# Patient Record
Sex: FEMALE | Race: OTHER | HISPANIC OR LATINO | URBAN - METROPOLITAN AREA
[De-identification: names, ages, dates, MRNs, and addresses within clinical notes are randomized per-mention and may not be internally consistent; named-entity substitution may affect disease eponyms.]

---

## 2017-08-21 ENCOUNTER — EMERGENCY (EMERGENCY)
Facility: HOSPITAL | Age: 67
LOS: 1 days | Discharge: HOME | End: 2017-08-21
Admitting: EMERGENCY MEDICINE
Payer: MEDICARE

## 2017-08-21 VITALS
OXYGEN SATURATION: 97 % | HEART RATE: 97 BPM | RESPIRATION RATE: 18 BRPM | TEMPERATURE: 99 F | DIASTOLIC BLOOD PRESSURE: 89 MMHG | WEIGHT: 190.04 LBS | SYSTOLIC BLOOD PRESSURE: 147 MMHG

## 2017-08-21 DIAGNOSIS — M25.512 PAIN IN LEFT SHOULDER: ICD-10-CM

## 2017-08-21 DIAGNOSIS — G89.29 OTHER CHRONIC PAIN: ICD-10-CM

## 2017-08-21 PROCEDURE — 99283 EMERGENCY DEPT VISIT LOW MDM: CPT

## 2017-08-21 RX ORDER — TRAMADOL HYDROCHLORIDE 50 MG/1
1 TABLET ORAL
Qty: 16 | Refills: 0 | OUTPATIENT
Start: 2017-08-21 | End: 2017-08-25

## 2017-08-21 RX ORDER — IBUPROFEN 200 MG
1 TABLET ORAL
Qty: 16 | Refills: 0 | OUTPATIENT
Start: 2017-08-21 | End: 2017-08-25

## 2017-08-21 RX ORDER — TRAMADOL HYDROCHLORIDE 50 MG/1
50 TABLET ORAL ONCE
Qty: 0 | Refills: 0 | Status: DISCONTINUED | OUTPATIENT
Start: 2017-08-21 | End: 2017-08-21

## 2017-08-21 RX ORDER — IBUPROFEN 200 MG
400 TABLET ORAL ONCE
Qty: 0 | Refills: 0 | Status: COMPLETED | OUTPATIENT
Start: 2017-08-21 | End: 2017-08-21

## 2017-08-21 RX ADMIN — TRAMADOL HYDROCHLORIDE 50 MILLIGRAM(S): 50 TABLET ORAL at 21:28

## 2017-08-21 RX ADMIN — Medication 400 MILLIGRAM(S): at 21:28

## 2017-08-21 NOTE — ED PROVIDER NOTE - MUSCULOSKELETAL, MLM
No spinal tend, FROM, no rash, no lesions; L shoulder - no deformity, no dislocation, no scapula tend, no clavicular tend, full passive ROM, limited active abduction 2/2 to pain, + light touch, soft compartments, no elbow tend, no U/M/R nerve deficits, muscle strength 5/5, good resistance, radial pulse 2+

## 2017-08-21 NOTE — ED PROVIDER NOTE - MEDICAL DECISION MAKING DETAILS
pt w/acute exacerbation of chronic L shoulder pain , has had PT for it, now pain w/abduction, no blunt trauma/ no dislocation nor deformity, placed in sling, will tx pain, importance of ortho f/u for mri and further tx discussed at length, pt and daughter understand and agree w/plan pt w/acute exacerbation of chronic L shoulder pain , has had PT for it, now pain w/abduction, no blunt trauma/ no dislocation nor deformity, placed in sling, will tx pain, importance of ortho f/u for mri and further tx (? frozen shoulder vs rotator cuff vs tendonitis) discussed at length, pt and daughter understand and agree w/plan

## 2017-08-21 NOTE — ED PROVIDER NOTE - OBJECTIVE STATEMENT
The pt is a 66 y/o F, who presents to ED w/daughter ( of choice), c/o L shoulder pain x 1 wk - hx of chronic pain, had PT for same, but aggravate it a wk ago. Pain is 7/10, taking tyl w/o relief, pain w/mov only - especially w/moving arm outward. Denies neck pain, numbness or tingling to fingers, swelling, rash, cp, sob

## 2017-08-21 NOTE — ED ADULT NURSE NOTE - OBJECTIVE STATEMENT
Patient Pt presents to ED A&Ox3 c/o left arm/hand pain x 1 week. hx of arthritis. denies numbness/tingling.

## 2017-08-21 NOTE — ED ADULT NURSE NOTE - CHPI ED SYMPTOMS NEG
no fever/no stiffness/no difficulty bearing weight/no tingling/no deformity/no back pain/no abrasion/no numbness/no bruising/no weakness

## 2019-02-19 ENCOUNTER — EMERGENCY (EMERGENCY)
Facility: HOSPITAL | Age: 69
LOS: 1 days | Discharge: HOME | End: 2019-02-19
Admitting: EMERGENCY MEDICINE
Payer: MEDICARE

## 2019-02-19 VITALS
TEMPERATURE: 98 F | HEART RATE: 87 BPM | RESPIRATION RATE: 18 BRPM | SYSTOLIC BLOOD PRESSURE: 174 MMHG | DIASTOLIC BLOOD PRESSURE: 79 MMHG | OXYGEN SATURATION: 96 % | WEIGHT: 190.04 LBS

## 2019-02-19 DIAGNOSIS — Z79.899 OTHER LONG TERM (CURRENT) DRUG THERAPY: ICD-10-CM

## 2019-02-19 DIAGNOSIS — Z79.1 LONG TERM (CURRENT) USE OF NON-STEROIDAL ANTI-INFLAMMATORIES (NSAID): ICD-10-CM

## 2019-02-19 DIAGNOSIS — Z79.2 LONG TERM (CURRENT) USE OF ANTIBIOTICS: ICD-10-CM

## 2019-02-19 DIAGNOSIS — J06.9 ACUTE UPPER RESPIRATORY INFECTION, UNSPECIFIED: ICD-10-CM

## 2019-02-19 DIAGNOSIS — R05 COUGH: ICD-10-CM

## 2019-02-19 PROCEDURE — 71046 X-RAY EXAM CHEST 2 VIEWS: CPT

## 2019-02-19 PROCEDURE — 71046 X-RAY EXAM CHEST 2 VIEWS: CPT | Mod: 26

## 2019-02-19 PROCEDURE — 99283 EMERGENCY DEPT VISIT LOW MDM: CPT | Mod: 25

## 2019-02-19 RX ORDER — AZITHROMYCIN 500 MG/1
1 TABLET, FILM COATED ORAL
Qty: 6 | Refills: 0 | OUTPATIENT
Start: 2019-02-19

## 2019-02-19 NOTE — ED PROVIDER NOTE - OBJECTIVE STATEMENT
69 y/o f hx arthritis presents c/o cough for the past 3 days, nonproductive, associated with nasal congestion.  Pt stating her  sick with similar sx.  Denies fever, chills, n/v/d, recent travel, all other ROS negative.

## 2019-02-19 NOTE — ED ADULT NURSE NOTE - OBJECTIVE STATEMENT
Pt presents with dry cough and chills for the last 3 days. Pt reports no chest pain, no shortness of breath.

## 2019-02-19 NOTE — ED PROVIDER NOTE - CLINICAL SUMMARY MEDICAL DECISION MAKING FREE TEXT BOX
67 y/o f presents with nasal congestion, cough x 3 days; vss, cxr negative, but will cover with zpack, f/u pmd

## 2019-02-19 NOTE — ED ADULT NURSE NOTE - NSIMPLEMENTINTERV_GEN_ALL_ED
Implemented All Universal Safety Interventions:  [place] to call system. Call bell, personal items and telephone within reach. Instruct patient to call for assistance. Room bathroom lighting operational. Non-slip footwear when patient is off stretcher. Physically safe environment: no spills, clutter or unnecessary equipment. Stretcher in lowest position, wheels locked, appropriate side rails in place.

## 2022-07-06 NOTE — ED ADULT NURSE NOTE - NSSISCREENINGQ4_ED_A_ED
Please see message below, thank you !     ----- Message from Isis Gómez sent at 7/6/2022  1:25 PM CDT -----  Pt is scheduled for a ultrasound tomorrow and the auth is still pending up to 7 business days.  Is this medically urgent?       Detail Level: Simple Which Covid 19 Vaccine Did Patient Receive (Optional)?: Pfizer Has The Patient Received Both Doses Of The Vaccine (If Applicable)?: Yes Has The Patient Been Infected With Covid-19 In The Past?: No Previous Infection Text: The patient has recovered from a previous COVID-19 infection. No

## 2024-05-20 NOTE — ED ADULT TRIAGE NOTE - MODE OF ARRIVAL
Public Transport (vanilla) Glucerna Therapeutic Nutrition 8oz. 2x daily (~440 Kcals, ~20 gm Protein)